# Patient Record
Sex: MALE | Race: WHITE | NOT HISPANIC OR LATINO
[De-identification: names, ages, dates, MRNs, and addresses within clinical notes are randomized per-mention and may not be internally consistent; named-entity substitution may affect disease eponyms.]

---

## 2021-12-03 ENCOUNTER — NON-APPOINTMENT (OUTPATIENT)
Age: 71
End: 2021-12-03

## 2021-12-03 ENCOUNTER — APPOINTMENT (OUTPATIENT)
Dept: CARDIOLOGY | Facility: CLINIC | Age: 71
End: 2021-12-03
Payer: MEDICARE

## 2021-12-03 VITALS
OXYGEN SATURATION: 97 % | BODY MASS INDEX: 28.73 KG/M2 | SYSTOLIC BLOOD PRESSURE: 130 MMHG | DIASTOLIC BLOOD PRESSURE: 68 MMHG | HEART RATE: 90 BPM | WEIGHT: 194 LBS | HEIGHT: 69 IN

## 2021-12-03 DIAGNOSIS — Z00.00 ENCOUNTER FOR GENERAL ADULT MEDICAL EXAMINATION W/OUT ABNORMAL FINDINGS: ICD-10-CM

## 2021-12-03 DIAGNOSIS — Z85.819 PERSONAL HISTORY OF MALIGNANT NEOPLASM OF UNSPECIFIED SITE OF LIP, ORAL CAVITY, AND PHARYNX: ICD-10-CM

## 2021-12-03 DIAGNOSIS — I25.2 OLD MYOCARDIAL INFARCTION: ICD-10-CM

## 2021-12-03 DIAGNOSIS — Z87.891 PERSONAL HISTORY OF NICOTINE DEPENDENCE: ICD-10-CM

## 2021-12-03 DIAGNOSIS — Z86.79 PERSONAL HISTORY OF OTHER DISEASES OF THE CIRCULATORY SYSTEM: ICD-10-CM

## 2021-12-03 DIAGNOSIS — E78.5 HYPERLIPIDEMIA, UNSPECIFIED: ICD-10-CM

## 2021-12-03 PROCEDURE — 93000 ELECTROCARDIOGRAM COMPLETE: CPT

## 2021-12-03 PROCEDURE — 99214 OFFICE O/P EST MOD 30 MIN: CPT

## 2021-12-03 RX ORDER — ATORVASTATIN CALCIUM 10 MG/1
10 TABLET, FILM COATED ORAL
Refills: 0 | Status: ACTIVE | COMMUNITY

## 2021-12-03 RX ORDER — PANTOPRAZOLE 40 MG/1
40 TABLET, DELAYED RELEASE ORAL
Refills: 0 | Status: ACTIVE | COMMUNITY

## 2021-12-03 NOTE — ASSESSMENT
[FreeTextEntry1] : 71 year  male with PMH of HTN, HLD and CAD. Patient is stable without any complaints. He can walk over half hr without any sign and symptoms of angina or CHF. His functional status is stable without any limitation. He is schedule for throat biopsy. \par There is no acute cardiac contraindication for throat biopsy please evaluate risk vs benefit before preceding.\par

## 2021-12-03 NOTE — HISTORY OF PRESENT ILLNESS
[FreeTextEntry1] : Patient is 71 year male with PMH of HTN, HLD, and CAD s/p PCI over three years ago at [facility].\par He was diagnosed with Epiglotis CA and treated with radiation. Now he is schedule for biopsy \par Patient came to be seen in office for clearance he is very active and can walk several blocks without any complaints. He denies sign and symptoms of angina or CHF.\par

## 2022-01-07 ENCOUNTER — NON-APPOINTMENT (OUTPATIENT)
Age: 72
End: 2022-01-07

## 2022-01-07 ENCOUNTER — APPOINTMENT (OUTPATIENT)
Dept: CARDIOLOGY | Facility: CLINIC | Age: 72
End: 2022-01-07
Payer: MEDICARE

## 2022-01-07 VITALS
BODY MASS INDEX: 27.85 KG/M2 | DIASTOLIC BLOOD PRESSURE: 82 MMHG | HEART RATE: 90 BPM | WEIGHT: 188 LBS | SYSTOLIC BLOOD PRESSURE: 120 MMHG | HEIGHT: 69 IN | OXYGEN SATURATION: 97 %

## 2022-01-07 PROCEDURE — 99214 OFFICE O/P EST MOD 30 MIN: CPT

## 2022-01-07 NOTE — ASSESSMENT
[FreeTextEntry1] : 71 year male with PMH of HTN, HLD and CAD. Patient is stable without any complaints. He can walk over half hr without any sign and symptoms of angina or CHF. His functional status is stable without any limitation. 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

## 2022-01-07 NOTE — HISTORY OF PRESENT ILLNESS
[FreeTextEntry1] : Patient is 71 year male with PMH of HTN, HLD, and CAD s/p PCI over three years ago at [facility].\par He was diagnosed with Epiglotis CA and treated with radiation. Biopsy Dec 2021 was negative for cancer after cytology results. Except mild mucus plug he is denying any complaints.\par He is very active and can walk several blocks without any complaints. He denies sign and symptoms of angina or CHF.\par \par

## 2022-01-07 NOTE — REVIEW OF SYSTEMS
Ibuprofen 400mg every 4-6 hrs. OR 600mg every 6 hrs with food  OR Tylenol 325-500mg (2) every 6 hrs.  Delsym or honey for cough suppressant  Increase fluids  Cool mist humidifier   COVID PCR will return tomorrow        [Mouth Sores] : mouth sores [Sore Throat] : sore throat [Negative] : Psychiatric [FreeTextEntry6] : Mucous plug.

## 2024-06-10 ENCOUNTER — APPOINTMENT (OUTPATIENT)
Dept: ORTHOPEDIC SURGERY | Facility: CLINIC | Age: 74
End: 2024-06-10

## 2024-06-11 ENCOUNTER — APPOINTMENT (OUTPATIENT)
Dept: ORTHOPEDIC SURGERY | Facility: CLINIC | Age: 74
End: 2024-06-11
Payer: MEDICARE

## 2024-06-11 VITALS — WEIGHT: 188 LBS | HEIGHT: 69 IN | BODY MASS INDEX: 27.85 KG/M2

## 2024-06-11 DIAGNOSIS — E78.00 PURE HYPERCHOLESTEROLEMIA, UNSPECIFIED: ICD-10-CM

## 2024-06-11 DIAGNOSIS — S22.42XA MULTIPLE FRACTURES OF RIBS, LEFT SIDE, INITIAL ENCOUNTER FOR CLOSED FRACTURE: ICD-10-CM

## 2024-06-11 DIAGNOSIS — I51.9 HEART DISEASE, UNSPECIFIED: ICD-10-CM

## 2024-06-11 DIAGNOSIS — Z95.5 PRESENCE OF CORONARY ANGIOPLASTY IMPLANT AND GRAFT: ICD-10-CM

## 2024-06-11 DIAGNOSIS — C80.1 MALIGNANT (PRIMARY) NEOPLASM, UNSPECIFIED: ICD-10-CM

## 2024-06-11 DIAGNOSIS — S50.02XA CONTUSION OF LEFT ELBOW, INITIAL ENCOUNTER: ICD-10-CM

## 2024-06-11 DIAGNOSIS — Z87.19 PERSONAL HISTORY OF OTHER DISEASES OF THE DIGESTIVE SYSTEM: ICD-10-CM

## 2024-06-11 PROCEDURE — 99203 OFFICE O/P NEW LOW 30 MIN: CPT

## 2024-06-11 PROCEDURE — 71100 X-RAY EXAM RIBS UNI 2 VIEWS: CPT | Mod: LT

## 2024-06-11 NOTE — PLAN
[TextEntry] : The patient was advised of the diagnosis. The natural history of the pathology was explained in full to the patient in layman's terms. All questions were answered. The risks and benefits of surgical and non-surgical treatment alternatives were explained in full to the patient.   Advised he should discuss pleural effusion and SOB with his PCP and pulmonologist.  Avoid twisting and turning, bending.  Rest.    Bandage applied to elbow abrasions.   Will recheck 3 weeks with repeat x-ray.

## 2024-06-11 NOTE — HISTORY OF PRESENT ILLNESS
[10] : 10 [5] : 5 [Burning] : burning [Sharp] : sharp [Shooting] : shooting [Constant] : constant [Meds] : meds [Sitting] : sitting [Lying in bed] : lying in bed [Coughing] : coughing [Retired] : Work status: retired [de-identified] : 6/11/24  Return visit for this 74 year old male who fell off a ladder on 6/6/24 from a 7-8 foot ladder injuring his lt ribcage in [place]. C/o acute lt ribcage pain, Seen at an emergency center in [place] where multiple rib fxs were noted, mildly displaced left fourth through seventh ribs and nondisplaced acute fractures of the left 10th and 11th ribs. He was prescribed Robaxin and Doxycycline.  Having some shortness of breath.  Sleeping in a recliner for now.  PMH:  Under the care of a physician for other reasons who prescribes him hydrocodone, and he is taking this every six to seven hours right now due to this injury. Hx throat cancer. [] : no [FreeTextEntry1] : left ribs [FreeTextEntry7] : upper chest [de-identified] : any movements/ breathing [de-identified] : 6/9/24 [de-identified] : [facility] [de-identified] : X ray CT scan [de-identified] : none

## 2024-06-11 NOTE — PHYSICAL EXAM
[Normal Mood and Affect] : normal mood and affect [Able to Communicate] : able to communicate [Well Developed] : well developed [Well Nourished] : well nourished [] : ecchymosis [Left] : left rib [Fracture] : Fracture [FreeTextEntry5] : Fractures left 4, 5, 6, 7, 10 and 11th ribs.    Left pleural effusion. [FreeTextEntry3] : Superficial abrasion, scabbed.  Clean and dry. Bruising in the area.

## 2024-07-02 ENCOUNTER — APPOINTMENT (OUTPATIENT)
Dept: ORTHOPEDIC SURGERY | Facility: CLINIC | Age: 74
End: 2024-07-02
Payer: MEDICARE

## 2024-07-02 VITALS — BODY MASS INDEX: 27.4 KG/M2 | HEIGHT: 69 IN | WEIGHT: 185 LBS

## 2024-07-02 PROBLEM — S22.42XD: Status: ACTIVE | Noted: 2024-07-02

## 2024-07-02 PROCEDURE — 71100 X-RAY EXAM RIBS UNI 2 VIEWS: CPT | Mod: LT

## 2024-07-02 PROCEDURE — 99213 OFFICE O/P EST LOW 20 MIN: CPT

## 2024-07-23 ENCOUNTER — APPOINTMENT (OUTPATIENT)
Dept: ORTHOPEDIC SURGERY | Facility: CLINIC | Age: 74
End: 2024-07-23
Payer: MEDICARE

## 2024-07-23 VITALS — BODY MASS INDEX: 27.4 KG/M2 | HEIGHT: 69 IN | WEIGHT: 185 LBS

## 2024-07-23 DIAGNOSIS — S22.42XD MULTIPLE FRACTURES OF RIBS, LEFT SIDE, SUBSEQUENT ENCOUNTER FOR FRACTURE WITH ROUTINE HEALING: ICD-10-CM

## 2024-07-23 PROCEDURE — 71100 X-RAY EXAM RIBS UNI 2 VIEWS: CPT | Mod: LT

## 2024-07-23 PROCEDURE — 99213 OFFICE O/P EST LOW 20 MIN: CPT

## 2024-07-23 NOTE — HISTORY OF PRESENT ILLNESS
[9] : 9 [Sharp] : sharp [Constant] : constant [Meds] : meds [Coughing] : coughing [6] : 6 [5] : 5 [Lying in bed] : lying in bed [Retired] : Work status: retired [de-identified] : FALL 06/06/24 07/23/24:  Is 6 1/2 weeks after a fall off a ladder and multiple left sided rib fractures. Feeling much better. Still occasional pain turning in bed. takes vicodin 10mg 1/2 pill prn only.  07/02/24:  Is 3 1/2 - 4 weeks after fracture of multiple left ribs. Had an episode of coughing and still c/o knife like pain along posterior lt rib. Hurts to take a deep breadth. Taking vicodin 10mg prn which he has at home.Still hurts to take a deep breadth.  6/11/24  Return visit for this 74 year old male who fell off a ladder on 6/6/24 from a 7-8 foot ladder injuring his lt ribcage in [place]. C/o acute lt ribcage pain, Seen at an emergency center in [place] where multiple rib fxs were noted, mildly displaced left fourth through seventh ribs and nondisplaced acute fractures of the left 10th and 11th ribs. He was prescribed Robaxin and Doxycycline.  Having some shortness of breath.  Sleeping in a recliner for now. On 6/9/24 was seen at [facility] where CT scan was performed.  PMH:  Under the care of a physician for other reasons who prescribes him hydrocodone, and he is taking this every six to seven hours right now due to this injury. Hx throat cancer. [] : no [FreeTextEntry1] : left ribs [de-identified] : //24/ [de-identified] : dr hoffman [de-identified] : none

## 2024-07-23 NOTE — HISTORY OF PRESENT ILLNESS
[9] : 9 [Sharp] : sharp [Constant] : constant [Meds] : meds [Coughing] : coughing [6] : 6 [5] : 5 [Lying in bed] : lying in bed [Retired] : Work status: retired [de-identified] : FALL 06/06/24 07/23/24:  Is 6 1/2 weeks after a fall off a ladder and multiple left sided rib fractures. Feeling much better. Still occasional pain turning in bed. takes vicodin 10mg 1/2 pill prn only.  07/02/24:  Is 3 1/2 - 4 weeks after fracture of multiple left ribs. Had an episode of coughing and still c/o knife like pain along posterior lt rib. Hurts to take a deep breadth. Taking vicodin 10mg prn which he has at home.Still hurts to take a deep breadth.  6/11/24  Return visit for this 74 year old male who fell off a ladder on 6/6/24 from a 7-8 foot ladder injuring his lt ribcage in [place]. C/o acute lt ribcage pain, Seen at an emergency center in [place] where multiple rib fxs were noted, mildly displaced left fourth through seventh ribs and nondisplaced acute fractures of the left 10th and 11th ribs. He was prescribed Robaxin and Doxycycline.  Having some shortness of breath.  Sleeping in a recliner for now. On 6/9/24 was seen at [facility] where CT scan was performed.  PMH:  Under the care of a physician for other reasons who prescribes him hydrocodone, and he is taking this every six to seven hours right now due to this injury. Hx throat cancer. [] : no [FreeTextEntry1] : left ribs [de-identified] : //24/ [de-identified] : dr hoffman [de-identified] : none

## 2024-07-23 NOTE — ASSESSMENT
[FreeTextEntry1] : 4, 5, 6, 7, 10 and 11, left ribs.  5th displaced. Left pleural effusion. Detail Level: Zone

## 2024-07-23 NOTE — PLAN
[TextEntry] : The patient was advised of the diagnosis. The natural history of the pathology was explained in full to the patient in layman's terms. All questions were answered. The risks and benefits of surgical and non-surgical treatment alternatives were explained in full to the patient. Pt will gradually increase activity level

## 2024-07-23 NOTE — PHYSICAL EXAM
[Normal Mood and Affect] : normal mood and affect [Able to Communicate] : able to communicate [Well Developed] : well developed [Well Nourished] : well nourished [Fracture] : Fracture [Left] : left elbow [] : ecchymosis [NL (90)] : forward flexion 90 degrees [NL (30)] : right lateral bending 30 degrees [Bending to right] : bending to right [FreeTextEntry8] : No rib tenderness [FreeTextEntry5] : Healing fractures left 4, 5, 6, 7, 10 and 11th ribs.    Lung field clear [FreeTextEntry3] : Superficial abrasion, scabbed.  Clean and dry. Bruising in the area.